# Patient Record
Sex: FEMALE | Race: AMERICAN INDIAN OR ALASKA NATIVE | Employment: UNEMPLOYED | URBAN - METROPOLITAN AREA
[De-identification: names, ages, dates, MRNs, and addresses within clinical notes are randomized per-mention and may not be internally consistent; named-entity substitution may affect disease eponyms.]

---

## 2024-01-01 ENCOUNTER — ANESTHESIA EVENT (OUTPATIENT)
Dept: SURGERY | Facility: MEDICAL CENTER | Age: 64
End: 2024-01-01
Payer: MEDICAID

## 2024-01-01 ENCOUNTER — ANESTHESIA (OUTPATIENT)
Dept: SURGERY | Facility: MEDICAL CENTER | Age: 64
End: 2024-01-01
Payer: MEDICAID

## 2024-01-01 ENCOUNTER — APPOINTMENT (OUTPATIENT)
Dept: RADIOLOGY | Facility: MEDICAL CENTER | Age: 64
End: 2024-01-01
Attending: INTERNAL MEDICINE
Payer: MEDICAID

## 2024-01-01 ENCOUNTER — APPOINTMENT (OUTPATIENT)
Dept: CARDIOLOGY | Facility: MEDICAL CENTER | Age: 64
End: 2024-01-01
Attending: INTERNAL MEDICINE
Payer: MEDICAID

## 2024-01-01 ENCOUNTER — HOSPITAL ENCOUNTER (INPATIENT)
Facility: MEDICAL CENTER | Age: 64
LOS: 1 days | End: 2024-03-29
Attending: INTERNAL MEDICINE | Admitting: INTERNAL MEDICINE
Payer: MEDICAID

## 2024-01-01 ENCOUNTER — HOSPITAL ENCOUNTER (OUTPATIENT)
Dept: RADIOLOGY | Facility: MEDICAL CENTER | Age: 64
End: 2024-03-28

## 2024-01-01 VITALS
HEART RATE: 54 BPM | WEIGHT: 276.68 LBS | DIASTOLIC BLOOD PRESSURE: 63 MMHG | HEIGHT: 64 IN | BODY MASS INDEX: 47.24 KG/M2 | OXYGEN SATURATION: 96 % | SYSTOLIC BLOOD PRESSURE: 89 MMHG | TEMPERATURE: 96.5 F | RESPIRATION RATE: 12 BRPM

## 2024-01-01 LAB
ABO + RH BLD: NORMAL
ABO GROUP BLD: NORMAL
ALBUMIN SERPL BCP-MCNC: 2.6 G/DL (ref 3.2–4.9)
ALBUMIN/GLOB SERPL: 0.8 G/DL
ALP SERPL-CCNC: 73 U/L (ref 30–99)
ALT SERPL-CCNC: 32 U/L (ref 2–50)
ANION GAP SERPL CALC-SCNC: 30 MMOL/L (ref 7–16)
ANISOCYTOSIS BLD QL SMEAR: ABNORMAL
APTT PPP: 52.6 SEC (ref 24.7–36)
AST SERPL-CCNC: 66 U/L (ref 12–45)
BASE EXCESS BLDA CALC-SCNC: -16 MMOL/L (ref -4–3)
BASOPHILS # BLD AUTO: 0 % (ref 0–1.8)
BASOPHILS # BLD: 0 K/UL (ref 0–0.12)
BILIRUB SERPL-MCNC: 0.6 MG/DL (ref 0.1–1.5)
BLD GP AB SCN SERPL QL: NORMAL
BODY TEMPERATURE: ABNORMAL DEGREES
BREATHS SETTING VENT: 22
BUN SERPL-MCNC: 32 MG/DL (ref 8–22)
BURR CELLS BLD QL SMEAR: NORMAL
CALCIUM ALBUM COR SERPL-MCNC: 9 MG/DL (ref 8.5–10.5)
CALCIUM SERPL-MCNC: 7.9 MG/DL (ref 8.5–10.5)
CFT BLD TEG: 7.5 MIN (ref 4.6–9.1)
CFT P HPASE BLD TEG: 7 MIN (ref 4.3–8.3)
CHLORIDE SERPL-SCNC: 103 MMOL/L (ref 96–112)
CLOT ANGLE BLD TEG: 72.7 DEGREES (ref 63–78)
CO2 BLDA-SCNC: 13 MMOL/L (ref 20–33)
CO2 SERPL-SCNC: 7 MMOL/L (ref 20–33)
CORTIS SERPL-MCNC: 43.5 UG/DL (ref 0–23)
CREAT SERPL-MCNC: 3.48 MG/DL (ref 0.5–1.4)
CT.EXTRINSIC BLD ROTEM: 1.3 MIN (ref 0.8–2.1)
DELSYS IDSYS: ABNORMAL
EOSINOPHIL # BLD AUTO: 0 K/UL (ref 0–0.51)
EOSINOPHIL NFR BLD: 0 % (ref 0–6.9)
ERYTHROCYTE [DISTWIDTH] IN BLOOD BY AUTOMATED COUNT: 59.1 FL (ref 35.9–50)
FIBRINOGEN PPP-MCNC: 346 MG/DL (ref 215–460)
GFR SERPLBLD CREATININE-BSD FMLA CKD-EPI: 14 ML/MIN/1.73 M 2
GLOBULIN SER CALC-MCNC: 3.2 G/DL (ref 1.9–3.5)
GLUCOSE BLD STRIP.AUTO-MCNC: 131 MG/DL (ref 65–99)
GLUCOSE BLD STRIP.AUTO-MCNC: 45 MG/DL (ref 65–99)
GLUCOSE SERPL-MCNC: 180 MG/DL (ref 65–99)
HCO3 BLDA-SCNC: 12.1 MMOL/L (ref 17–25)
HCT VFR BLD AUTO: 44 % (ref 37–47)
HGB BLD-MCNC: 13.6 G/DL (ref 12–16)
HOROWITZ INDEX BLDA+IHG-RTO: 226 MM[HG]
INR PPP: 1.56 (ref 0.87–1.13)
LACTATE BLD-SCNC: 12.2 MMOL/L (ref 0.5–2)
LACTATE SERPL-SCNC: 12.1 MMOL/L (ref 0.5–2)
LACTATE SERPL-SCNC: 14.3 MMOL/L (ref 0.5–2)
LYMPHOCYTES # BLD AUTO: 0.37 K/UL (ref 1–4.8)
LYMPHOCYTES NFR BLD: 0.9 % (ref 22–41)
MAGNESIUM SERPL-MCNC: 1.4 MG/DL (ref 1.5–2.5)
MANUAL DIFF BLD: ABNORMAL
MCF BLD TEG: 60.9 MM (ref 52–69)
MCF.PLATELET INHIB BLD ROTEM: 21.9 MM (ref 15–32)
MCH RBC QN AUTO: 32 PG (ref 27–33)
MCHC RBC AUTO-ENTMCNC: 30.9 G/DL (ref 32.2–35.5)
MCV RBC AUTO: 103.5 FL (ref 81.4–97.8)
METAMYELOCYTES NFR BLD MANUAL: 17 %
MICROCYTES BLD QL SMEAR: ABNORMAL
MODE IMODE: ABNORMAL
MONOCYTES # BLD AUTO: 0.33 K/UL (ref 0–0.85)
MONOCYTES NFR BLD AUTO: 0.8 % (ref 0–13.4)
MORPHOLOGY BLD-IMP: NORMAL
MYELOCYTES NFR BLD MANUAL: 0.8 %
NEUTROPHILS # BLD AUTO: 33.33 K/UL (ref 1.82–7.42)
NEUTROPHILS NFR BLD: 67.8 % (ref 44–72)
NEUTS BAND NFR BLD MANUAL: 12.7 % (ref 0–10)
NRBC # BLD AUTO: 0 K/UL
NRBC BLD-RTO: 0 /100 WBC (ref 0–0.2)
NT-PROBNP SERPL IA-MCNC: ABNORMAL PG/ML (ref 0–125)
O2/TOTAL GAS SETTING VFR VENT: 50 %
PA AA BLD-ACNC: 90.1 % (ref 0–11)
PA ADP BLD-ACNC: 97.4 % (ref 0–17)
PCO2 BLDA: 36.1 MMHG (ref 26–37)
PCO2 TEMP ADJ BLDA: 35.5 MMHG (ref 26–37)
PEEP END EXPIRATORY PRESSURE IPEEP: 8 CMH20
PH BLDA: 7.13 [PH] (ref 7.4–7.5)
PH TEMP ADJ BLDA: 7.14 [PH] (ref 7.4–7.5)
PHOSPHATE SERPL-MCNC: 6.4 MG/DL (ref 2.5–4.5)
PLATELET # BLD AUTO: 131 K/UL (ref 164–446)
PLATELET BLD QL SMEAR: NORMAL
PMV BLD AUTO: 11 FL (ref 9–12.9)
PO2 BLDA: 113 MMHG (ref 64–87)
PO2 TEMP ADJ BLDA: 111 MMHG (ref 64–87)
POIKILOCYTOSIS BLD QL SMEAR: NORMAL
POTASSIUM SERPL-SCNC: 4.8 MMOL/L (ref 3.6–5.5)
PROT SERPL-MCNC: 5.8 G/DL (ref 6–8.2)
PROTHROMBIN TIME: 18.9 SEC (ref 12–14.6)
RBC # BLD AUTO: 4.25 M/UL (ref 4.2–5.4)
RBC BLD AUTO: PRESENT
RH BLD: NORMAL
SAO2 % BLDA: 97 % (ref 93–99)
SODIUM SERPL-SCNC: 140 MMOL/L (ref 135–145)
SPECIMEN DRAWN FROM PATIENT: ABNORMAL
T4 FREE SERPL-MCNC: 0.99 NG/DL (ref 0.93–1.7)
TEG ALGORITHM TGALG: ABNORMAL
TIDAL VOLUME IVT: 340 ML
TROPONIN T SERPL-MCNC: 243 NG/L (ref 6–19)
TSH SERPL DL<=0.005 MIU/L-ACNC: 11.5 UIU/ML (ref 0.38–5.33)
WBC # BLD AUTO: 41.4 K/UL (ref 4.8–10.8)

## 2024-01-01 PROCEDURE — 160042 HCHG SURGERY MINUTES - EA ADDL 1 MIN LEVEL 5: Performed by: SURGERY

## 2024-01-01 PROCEDURE — 82533 TOTAL CORTISOL: CPT

## 2024-01-01 PROCEDURE — 700101 HCHG RX REV CODE 250: Performed by: NURSE PRACTITIONER

## 2024-01-01 PROCEDURE — 86850 RBC ANTIBODY SCREEN: CPT

## 2024-01-01 PROCEDURE — 71045 X-RAY EXAM CHEST 1 VIEW: CPT

## 2024-01-01 PROCEDURE — 94002 VENT MGMT INPAT INIT DAY: CPT

## 2024-01-01 PROCEDURE — 700101 HCHG RX REV CODE 250: Performed by: INTERNAL MEDICINE

## 2024-01-01 PROCEDURE — 85384 FIBRINOGEN ACTIVITY: CPT

## 2024-01-01 PROCEDURE — 49000 EXPLORATION OF ABDOMEN: CPT | Mod: 80 | Performed by: SURGERY

## 2024-01-01 PROCEDURE — 99292 CRITICAL CARE ADDL 30 MIN: CPT | Performed by: INTERNAL MEDICINE

## 2024-01-01 PROCEDURE — 99255 IP/OBS CONSLTJ NEW/EST HI 80: CPT | Mod: 57 | Performed by: SURGERY

## 2024-01-01 PROCEDURE — 700105 HCHG RX REV CODE 258: Performed by: INTERNAL MEDICINE

## 2024-01-01 PROCEDURE — 84484 ASSAY OF TROPONIN QUANT: CPT

## 2024-01-01 PROCEDURE — 86900 BLOOD TYPING SEROLOGIC ABO: CPT

## 2024-01-01 PROCEDURE — C1751 CATH, INF, PER/CENT/MIDLINE: HCPCS | Performed by: SURGERY

## 2024-01-01 PROCEDURE — 700111 HCHG RX REV CODE 636 W/ 250 OVERRIDE (IP): Performed by: ANESTHESIOLOGY

## 2024-01-01 PROCEDURE — 99291 CRITICAL CARE FIRST HOUR: CPT | Performed by: INTERNAL MEDICINE

## 2024-01-01 PROCEDURE — 97606 NEG PRS WND THER DME>50 SQCM: CPT | Performed by: SURGERY

## 2024-01-01 PROCEDURE — 80053 COMPREHEN METABOLIC PANEL: CPT

## 2024-01-01 PROCEDURE — 84443 ASSAY THYROID STIM HORMONE: CPT

## 2024-01-01 PROCEDURE — 85007 BL SMEAR W/DIFF WBC COUNT: CPT

## 2024-01-01 PROCEDURE — 700111 HCHG RX REV CODE 636 W/ 250 OVERRIDE (IP): Performed by: NURSE PRACTITIONER

## 2024-01-01 PROCEDURE — 160048 HCHG OR STATISTICAL LEVEL 1-5: Performed by: SURGERY

## 2024-01-01 PROCEDURE — 85027 COMPLETE CBC AUTOMATED: CPT

## 2024-01-01 PROCEDURE — 82962 GLUCOSE BLOOD TEST: CPT | Mod: 91

## 2024-01-01 PROCEDURE — 49000 EXPLORATION OF ABDOMEN: CPT | Performed by: SURGERY

## 2024-01-01 PROCEDURE — 82803 BLOOD GASES ANY COMBINATION: CPT

## 2024-01-01 PROCEDURE — 700111 HCHG RX REV CODE 636 W/ 250 OVERRIDE (IP): Performed by: INTERNAL MEDICINE

## 2024-01-01 PROCEDURE — 84100 ASSAY OF PHOSPHORUS: CPT

## 2024-01-01 PROCEDURE — 85610 PROTHROMBIN TIME: CPT

## 2024-01-01 PROCEDURE — 85384 FIBRINOGEN ACTIVITY: CPT | Mod: 91

## 2024-01-01 PROCEDURE — 83735 ASSAY OF MAGNESIUM: CPT

## 2024-01-01 PROCEDURE — 85730 THROMBOPLASTIN TIME PARTIAL: CPT

## 2024-01-01 PROCEDURE — 97606 NEG PRS WND THER DME>50 SQCM: CPT | Mod: 80 | Performed by: SURGERY

## 2024-01-01 PROCEDURE — 83605 ASSAY OF LACTIC ACID: CPT

## 2024-01-01 PROCEDURE — 85347 COAGULATION TIME ACTIVATED: CPT

## 2024-01-01 PROCEDURE — 700105 HCHG RX REV CODE 258: Performed by: ANESTHESIOLOGY

## 2024-01-01 PROCEDURE — 700101 HCHG RX REV CODE 250: Performed by: ANESTHESIOLOGY

## 2024-01-01 PROCEDURE — 83880 ASSAY OF NATRIURETIC PEPTIDE: CPT

## 2024-01-01 PROCEDURE — 0DJD0ZZ INSPECTION OF LOWER INTESTINAL TRACT, OPEN APPROACH: ICD-10-PCS | Performed by: SURGERY

## 2024-01-01 PROCEDURE — 5A1935Z RESPIRATORY VENTILATION, LESS THAN 24 CONSECUTIVE HOURS: ICD-10-PCS | Performed by: SURGERY

## 2024-01-01 PROCEDURE — 94003 VENT MGMT INPAT SUBQ DAY: CPT

## 2024-01-01 PROCEDURE — 770022 HCHG ROOM/CARE - ICU (200)

## 2024-01-01 PROCEDURE — 37799 UNLISTED PX VASCULAR SURGERY: CPT

## 2024-01-01 PROCEDURE — 700105 HCHG RX REV CODE 258: Performed by: NURSE PRACTITIONER

## 2024-01-01 PROCEDURE — 85576 BLOOD PLATELET AGGREGATION: CPT | Mod: 91

## 2024-01-01 PROCEDURE — 700111 HCHG RX REV CODE 636 W/ 250 OVERRIDE (IP): Mod: JZ | Performed by: INTERNAL MEDICINE

## 2024-01-01 PROCEDURE — 160009 HCHG ANES TIME/MIN: Performed by: SURGERY

## 2024-01-01 PROCEDURE — 84439 ASSAY OF FREE THYROXINE: CPT

## 2024-01-01 PROCEDURE — 86901 BLOOD TYPING SEROLOGIC RH(D): CPT

## 2024-01-01 PROCEDURE — 94799 UNLISTED PULMONARY SVC/PX: CPT

## 2024-01-01 PROCEDURE — 160031 HCHG SURGERY MINUTES - 1ST 30 MINS LEVEL 5: Performed by: SURGERY

## 2024-01-01 RX ORDER — AMOXICILLIN 250 MG
2 CAPSULE ORAL 2 TIMES DAILY
Status: DISCONTINUED | OUTPATIENT
Start: 2024-01-01 | End: 2024-01-01

## 2024-01-01 RX ORDER — ONDANSETRON 2 MG/ML
4 INJECTION INTRAMUSCULAR; INTRAVENOUS EVERY 4 HOURS PRN
Status: DISCONTINUED | OUTPATIENT
Start: 2024-01-01 | End: 2024-01-01

## 2024-01-01 RX ORDER — PROMETHAZINE HYDROCHLORIDE 25 MG/1
12.5-25 TABLET ORAL EVERY 4 HOURS PRN
Status: DISCONTINUED | OUTPATIENT
Start: 2024-01-01 | End: 2024-01-01

## 2024-01-01 RX ORDER — FAMOTIDINE 20 MG/1
20 TABLET, FILM COATED ORAL DAILY
Status: DISCONTINUED | OUTPATIENT
Start: 2024-01-01 | End: 2024-01-01

## 2024-01-01 RX ORDER — HYDROMORPHONE HYDROCHLORIDE 2 MG/ML
2 INJECTION, SOLUTION INTRAMUSCULAR; INTRAVENOUS; SUBCUTANEOUS
Status: DISCONTINUED | OUTPATIENT
Start: 2024-01-01 | End: 2024-01-01 | Stop reason: HOSPADM

## 2024-01-01 RX ORDER — LORAZEPAM 2 MG/ML
1 CONCENTRATE ORAL
Status: DISCONTINUED | OUTPATIENT
Start: 2024-01-01 | End: 2024-01-01 | Stop reason: HOSPADM

## 2024-01-01 RX ORDER — CEFOTETAN DISODIUM 2 G/20ML
INJECTION, POWDER, FOR SOLUTION INTRAMUSCULAR; INTRAVENOUS PRN
Status: DISCONTINUED | OUTPATIENT
Start: 2024-01-01 | End: 2024-01-01 | Stop reason: SURG

## 2024-01-01 RX ORDER — DEXMEDETOMIDINE HYDROCHLORIDE 4 UG/ML
.1-1.5 INJECTION, SOLUTION INTRAVENOUS CONTINUOUS
Status: DISCONTINUED | OUTPATIENT
Start: 2024-01-01 | End: 2024-01-01

## 2024-01-01 RX ORDER — FUROSEMIDE 40 MG/1
40 TABLET ORAL
COMMUNITY

## 2024-01-01 RX ORDER — CALCIUM GLUCONATE 20 MG/ML
1 INJECTION, SOLUTION INTRAVENOUS ONCE
Status: COMPLETED | OUTPATIENT
Start: 2024-01-01 | End: 2024-01-01

## 2024-01-01 RX ORDER — ETOMIDATE 2 MG/ML
INJECTION INTRAVENOUS PRN
Status: DISCONTINUED | OUTPATIENT
Start: 2024-01-01 | End: 2024-01-01 | Stop reason: SURG

## 2024-01-01 RX ORDER — SODIUM CHLORIDE, SODIUM LACTATE, POTASSIUM CHLORIDE, CALCIUM CHLORIDE 600; 310; 30; 20 MG/100ML; MG/100ML; MG/100ML; MG/100ML
INJECTION, SOLUTION INTRAVENOUS
Status: DISCONTINUED | OUTPATIENT
Start: 2024-01-01 | End: 2024-01-01 | Stop reason: SURG

## 2024-01-01 RX ORDER — HYDROMORPHONE HYDROCHLORIDE 2 MG/ML
4 INJECTION, SOLUTION INTRAMUSCULAR; INTRAVENOUS; SUBCUTANEOUS
Status: DISCONTINUED | OUTPATIENT
Start: 2024-01-01 | End: 2024-01-01 | Stop reason: HOSPADM

## 2024-01-01 RX ORDER — ROCURONIUM BROMIDE 10 MG/ML
INJECTION, SOLUTION INTRAVENOUS PRN
Status: DISCONTINUED | OUTPATIENT
Start: 2024-01-01 | End: 2024-01-01 | Stop reason: SURG

## 2024-01-01 RX ORDER — SODIUM CHLORIDE, SODIUM LACTATE, POTASSIUM CHLORIDE, AND CALCIUM CHLORIDE .6; .31; .03; .02 G/100ML; G/100ML; G/100ML; G/100ML
1000 INJECTION, SOLUTION INTRAVENOUS ONCE
Status: COMPLETED | OUTPATIENT
Start: 2024-01-01 | End: 2024-01-01

## 2024-01-01 RX ORDER — MAGNESIUM SULFATE HEPTAHYDRATE 40 MG/ML
4 INJECTION, SOLUTION INTRAVENOUS ONCE
Status: COMPLETED | OUTPATIENT
Start: 2024-01-01 | End: 2024-01-01

## 2024-01-01 RX ORDER — LORAZEPAM 2 MG/ML
2 INJECTION INTRAMUSCULAR
Status: DISCONTINUED | OUTPATIENT
Start: 2024-01-01 | End: 2024-01-01 | Stop reason: HOSPADM

## 2024-01-01 RX ORDER — ATROPINE SULFATE 10 MG/ML
2 SOLUTION/ DROPS OPHTHALMIC EVERY 4 HOURS PRN
Status: DISCONTINUED | OUTPATIENT
Start: 2024-01-01 | End: 2024-01-01 | Stop reason: HOSPADM

## 2024-01-01 RX ORDER — HYDROMORPHONE HYDROCHLORIDE 1 MG/ML
1.5 INJECTION, SOLUTION INTRAMUSCULAR; INTRAVENOUS; SUBCUTANEOUS
Status: DISCONTINUED | OUTPATIENT
Start: 2024-01-01 | End: 2024-01-01

## 2024-01-01 RX ORDER — PROCHLORPERAZINE EDISYLATE 5 MG/ML
5-10 INJECTION INTRAMUSCULAR; INTRAVENOUS EVERY 4 HOURS PRN
Status: DISCONTINUED | OUTPATIENT
Start: 2024-01-01 | End: 2024-01-01

## 2024-01-01 RX ORDER — HYDROMORPHONE HYDROCHLORIDE 1 MG/ML
0.75 INJECTION, SOLUTION INTRAMUSCULAR; INTRAVENOUS; SUBCUTANEOUS
Status: DISCONTINUED | OUTPATIENT
Start: 2024-01-01 | End: 2024-01-01

## 2024-01-01 RX ORDER — SUCCINYLCHOLINE CHLORIDE 20 MG/ML
INJECTION INTRAMUSCULAR; INTRAVENOUS PRN
Status: DISCONTINUED | OUTPATIENT
Start: 2024-01-01 | End: 2024-01-01 | Stop reason: SURG

## 2024-01-01 RX ORDER — SODIUM BICARBONATE IN D5W 150/1000ML
PLASTIC BAG, INJECTION (ML) INTRAVENOUS CONTINUOUS
Status: DISCONTINUED | OUTPATIENT
Start: 2024-01-01 | End: 2024-01-01

## 2024-01-01 RX ORDER — ONDANSETRON 4 MG/1
4 TABLET, ORALLY DISINTEGRATING ORAL EVERY 4 HOURS PRN
Status: DISCONTINUED | OUTPATIENT
Start: 2024-01-01 | End: 2024-01-01

## 2024-01-01 RX ORDER — BISACODYL 10 MG
10 SUPPOSITORY, RECTAL RECTAL
Status: DISCONTINUED | OUTPATIENT
Start: 2024-01-01 | End: 2024-01-01

## 2024-01-01 RX ORDER — APIXABAN 5 MG/1
5 TABLET, FILM COATED ORAL 2 TIMES DAILY
COMMUNITY
Start: 2024-01-01

## 2024-01-01 RX ORDER — SACUBITRIL AND VALSARTAN 24; 26 MG/1; MG/1
1 TABLET, FILM COATED ORAL 2 TIMES DAILY
Status: ON HOLD | COMMUNITY
Start: 2024-01-01 | End: 2024-01-01

## 2024-01-01 RX ORDER — NOREPINEPHRINE BITARTRATE 0.03 MG/ML
0-1 INJECTION, SOLUTION INTRAVENOUS CONTINUOUS
Status: DISCONTINUED | OUTPATIENT
Start: 2024-01-01 | End: 2024-01-01

## 2024-01-01 RX ORDER — POLYETHYLENE GLYCOL 3350 17 G/17G
1 POWDER, FOR SOLUTION ORAL
Status: DISCONTINUED | OUTPATIENT
Start: 2024-01-01 | End: 2024-01-01

## 2024-01-01 RX ORDER — SODIUM CHLORIDE, SODIUM LACTATE, POTASSIUM CHLORIDE, CALCIUM CHLORIDE 600; 310; 30; 20 MG/100ML; MG/100ML; MG/100ML; MG/100ML
INJECTION, SOLUTION INTRAVENOUS CONTINUOUS
Status: DISCONTINUED | OUTPATIENT
Start: 2024-01-01 | End: 2024-01-01

## 2024-01-01 RX ORDER — NOREPINEPHRINE BITARTRATE 0.03 MG/ML
INJECTION, SOLUTION INTRAVENOUS
Status: DISCONTINUED
Start: 2024-01-01 | End: 2024-01-01

## 2024-01-01 RX ORDER — PROMETHAZINE HYDROCHLORIDE 25 MG/1
12.5-25 SUPPOSITORY RECTAL EVERY 4 HOURS PRN
Status: DISCONTINUED | OUTPATIENT
Start: 2024-01-01 | End: 2024-01-01

## 2024-01-01 RX ADMIN — SODIUM BICARBONATE 100 MEQ: 84 INJECTION INTRAVENOUS at 16:47

## 2024-01-01 RX ADMIN — HYDROMORPHONE HYDROCHLORIDE 2 MG: 2 INJECTION, SOLUTION INTRAMUSCULAR; INTRAVENOUS; SUBCUTANEOUS at 01:37

## 2024-01-01 RX ADMIN — PHENYLEPHRINE HYDROCHLORIDE 0.25 MCG/KG/MIN: 100 INJECTION INTRAVENOUS at 23:06

## 2024-01-01 RX ADMIN — DEXMEDETOMIDINE HYDROCHLORIDE 0.2 MCG/KG/HR: 100 INJECTION, SOLUTION INTRAVENOUS at 22:28

## 2024-01-01 RX ADMIN — HYDROMORPHONE HYDROCHLORIDE 2 MG: 2 INJECTION, SOLUTION INTRAMUSCULAR; INTRAVENOUS; SUBCUTANEOUS at 01:12

## 2024-01-01 RX ADMIN — PIPERACILLIN AND TAZOBACTAM 4.5 G: 4; .5 INJECTION, POWDER, FOR SOLUTION INTRAVENOUS at 16:26

## 2024-01-01 RX ADMIN — HYDROMORPHONE HYDROCHLORIDE 1.5 MG: 1 INJECTION, SOLUTION INTRAMUSCULAR; INTRAVENOUS; SUBCUTANEOUS at 19:45

## 2024-01-01 RX ADMIN — MAGNESIUM SULFATE HEPTAHYDRATE 4 G: 4 INJECTION, SOLUTION INTRAVENOUS at 21:49

## 2024-01-01 RX ADMIN — SODIUM CHLORIDE, POTASSIUM CHLORIDE, SODIUM LACTATE AND CALCIUM CHLORIDE: 600; 310; 30; 20 INJECTION, SOLUTION INTRAVENOUS at 18:15

## 2024-01-01 RX ADMIN — ONDANSETRON 4 MG: 2 INJECTION INTRAMUSCULAR; INTRAVENOUS at 17:09

## 2024-01-01 RX ADMIN — CALCIUM GLUCONATE 1 G: 20 INJECTION, SOLUTION INTRAVENOUS at 23:24

## 2024-01-01 RX ADMIN — DILTIAZEM HYDROCHLORIDE 5 MG/HR: 5 INJECTION INTRAVENOUS at 20:53

## 2024-01-01 RX ADMIN — NOREPINEPHRINE BITARTRATE 0.1 MCG/KG/MIN: 1 INJECTION, SOLUTION, CONCENTRATE INTRAVENOUS at 18:32

## 2024-01-01 RX ADMIN — NOREPINEPHRINE BITARTRATE 1 MCG/KG/MIN: 1 INJECTION, SOLUTION, CONCENTRATE INTRAVENOUS at 23:47

## 2024-01-01 RX ADMIN — VASOPRESSIN 0.03 UNITS/MIN: 20 INJECTION INTRAVENOUS at 22:15

## 2024-01-01 RX ADMIN — PIPERACILLIN AND TAZOBACTAM 4.5 G: 4; .5 INJECTION, POWDER, FOR SOLUTION INTRAVENOUS at 19:48

## 2024-01-01 RX ADMIN — Medication 1.5 MG/HR: at 20:58

## 2024-01-01 RX ADMIN — SUCCINYLCHOLINE CHLORIDE 200 MG: 20 INJECTION, SOLUTION INTRAMUSCULAR; INTRAVENOUS at 18:29

## 2024-01-01 RX ADMIN — ROCURONIUM BROMIDE 30 MG: 50 INJECTION, SOLUTION INTRAVENOUS at 18:59

## 2024-01-01 RX ADMIN — LORAZEPAM 2 MG: 2 INJECTION INTRAMUSCULAR; INTRAVENOUS at 02:08

## 2024-01-01 RX ADMIN — HYDROMORPHONE HYDROCHLORIDE 0.75 MG: 1 INJECTION, SOLUTION INTRAMUSCULAR; INTRAVENOUS; SUBCUTANEOUS at 19:32

## 2024-01-01 RX ADMIN — CEFOTETAN DISODIUM 2 G: 2 INJECTION, POWDER, FOR SOLUTION INTRAMUSCULAR; INTRAVENOUS at 18:29

## 2024-01-01 RX ADMIN — DEXTROSE MONOHYDRATE 25 G: 100 INJECTION, SOLUTION INTRAVENOUS at 16:24

## 2024-01-01 RX ADMIN — ETOMIDATE 16 MG: 2 INJECTION, SOLUTION INTRAVENOUS at 18:29

## 2024-01-01 RX ADMIN — SODIUM CHLORIDE, POTASSIUM CHLORIDE, SODIUM LACTATE AND CALCIUM CHLORIDE 1000 ML: 600; 310; 30; 20 INJECTION, SOLUTION INTRAVENOUS at 21:23

## 2024-01-01 RX ADMIN — SODIUM BICARBONATE: 84 INJECTION, SOLUTION INTRAVENOUS at 16:58

## 2024-01-01 RX ADMIN — SUGAMMADEX 200 MG: 100 INJECTION, SOLUTION INTRAVENOUS at 19:25

## 2024-01-01 RX ADMIN — NOREPINEPHRINE BITARTRATE 0.85 MCG/KG/MIN: 1 INJECTION INTRAVENOUS at 21:30

## 2024-01-01 RX ADMIN — NOREPINEPHRINE BITARTRATE 0.85 MCG/KG/MIN: 1 INJECTION INTRAVENOUS at 21:56

## 2024-01-01 ASSESSMENT — PAIN DESCRIPTION - PAIN TYPE
TYPE: ACUTE PAIN

## 2024-01-01 ASSESSMENT — ENCOUNTER SYMPTOMS
COUGH: 0
FEVER: 0
ABDOMINAL PAIN: 1
BRUISES/BLEEDS EASILY: 0
SORE THROAT: 0
BACK PAIN: 0
WEAKNESS: 0
DOUBLE VISION: 0
SHORTNESS OF BREATH: 1
FOCAL WEAKNESS: 0
VOMITING: 0
SENSORY CHANGE: 0
BLURRED VISION: 0
CHILLS: 0
NECK PAIN: 0
DIZZINESS: 0
NERVOUS/ANXIOUS: 0
DEPRESSION: 0
DIARRHEA: 1
NAUSEA: 0
FLANK PAIN: 0

## 2024-01-01 ASSESSMENT — FIBROSIS 4 INDEX: FIB4 SCORE: 4.33

## 2024-01-01 ASSESSMENT — PAIN SCALES - GENERAL: PAIN_LEVEL: 5

## 2024-03-28 PROBLEM — G93.40 ACUTE ENCEPHALOPATHY: Status: ACTIVE | Noted: 2024-01-01

## 2024-03-28 PROBLEM — K55.9 MESENTERIC ISCHEMIA (HCC): Status: ACTIVE | Noted: 2024-01-01

## 2024-03-28 PROBLEM — E87.6 HYPOKALEMIA: Status: ACTIVE | Noted: 2024-01-01

## 2024-03-28 PROBLEM — E87.20 LACTIC ACIDOSIS: Status: ACTIVE | Noted: 2024-01-01

## 2024-03-28 PROBLEM — G47.33 OSA (OBSTRUCTIVE SLEEP APNEA): Status: ACTIVE | Noted: 2024-01-01

## 2024-03-28 PROBLEM — I48.91 ATRIAL FIBRILLATION WITH RVR (HCC): Status: ACTIVE | Noted: 2024-01-01

## 2024-03-28 PROBLEM — R65.20 SEVERE SEPSIS (HCC): Status: ACTIVE | Noted: 2024-01-01

## 2024-03-28 PROBLEM — E66.01 MORBID OBESITY (HCC): Status: ACTIVE | Noted: 2024-01-01

## 2024-03-28 PROBLEM — N17.9 AKI (ACUTE KIDNEY INJURY) (HCC): Status: ACTIVE | Noted: 2024-01-01

## 2024-03-28 PROBLEM — A41.9 SEVERE SEPSIS (HCC): Status: ACTIVE | Noted: 2024-01-01

## 2024-03-28 PROBLEM — R79.89 ELEVATED TROPONIN: Status: ACTIVE | Noted: 2024-01-01

## 2024-03-28 PROBLEM — E16.2 HYPOGLYCEMIA: Status: ACTIVE | Noted: 2024-01-01

## 2024-03-28 PROBLEM — I50.22 CHRONIC SYSTOLIC HEART FAILURE (HCC): Status: ACTIVE | Noted: 2024-01-01

## 2024-03-28 PROBLEM — Z79.02 ANTIPLATELET OR ANTITHROMBOTIC LONG-TERM USE: Status: ACTIVE | Noted: 2024-01-01

## 2024-03-28 NOTE — PROGRESS NOTES
4 Eyes Skin Assessment Completed by MERVIN Davis and MERVIN Reed.    Head WDL  Ears WDL  Nose WDL  Mouth Bleeding  Tongue bleeding/dry/cracked   Neck WDL  Breast/Chest WDL  Shoulder Blades WDL  Spine WDL  (R) Arm/Elbow/Hand Redness and Non-Blanching mottled  (L) Arm/Elbow/Hand Redness and Non-Blanching mottled  Abdomen WDL  Groin Redness, mottled, moist  Scrotum/Coccyx/Buttocks Redness mottled  (R) Leg Redness and Non-Blanching ulcers  (L) Leg Redness and Non-Blanching ulcers  (R) Heel/Foot/Toe Redness and Non-Blanching, ulcers  (L) Heel/Foot/Toe Redness and Non-Blanching, ulcers          Devices In Places ECG, Blood Pressure Cuff, Pulse Ox, and SCD's      Interventions In Place Gray Ear Foams, Sacral Mepilex, TAP System, Pillows, Q2 Turns, Low Air Loss Mattress, Barrier Cream, and Heels Loaded W/Pillows    Possible Skin Injury No    Pictures Uploaded Into Epic Yes  Wound Consult Placed N/A  RN Wound Prevention Protocol Ordered No

## 2024-03-28 NOTE — CONSULTS
DATE OF CONSULTATION:  3/28/2024     REFERRING PHYSICIAN:   Raulito Menjivar M.D.     CONSULTING PHYSICIAN:  Melissa Ratliff M.D.     REASON FOR CONSULTATION:  I have been asked by Dr. Menjivar to see the patient in surgical consultation for evaluation of portal venous gas.    HISTORY OF PRESENT ILLNESS: The patient is a 63 year-old  morbidly obese woman who is very ill and mottled appearing and states she has been incredibly ill for three days.  She went to an outside hospital today and has severe abdominal pain.  No distention.  Her pain is out of proportion to exam.  She denies nausea or vomiting.  She also denies fever. She was initially quite hypotensive at the outside hospital, but improved with fluid resuscitation.  She has a history of Atrial fibrillation with RVR as well as severe heart failure, but is non compliant with her medications.  She states she has not been taking the eliquis she is supposed to be on.  She has a severe leukocytosis and portal venous gas on CT as well as pain out of proportion to exam.  Also, concerning, she has a very elevated troponin from the outside hospital.     PAST MEDICAL HISTORY:  Atrial fibrillation, Heart Failure (30% LVEF in 2022 with severe right sided dilation and hypokinesis), morbid obesity, Sleep apnea    PAST SURGICAL HISTORY:  has no past surgical history on file.    ALLERGIES: No Known Allergies    CURRENT MEDICATIONS:    Home Medications       Reviewed by Larry Howe (Pharmacy Tech) on 03/28/24 at 1622  Med List Status: Unable to Obtain     Medication Last Dose Status        Patient Denies Taking any Medications                           FAMILY HISTORY: family history is not on file.    SOCIAL HISTORY:  Sister is her decision maker    REVIEW OF SYSTEMS: Comprehensive review of systems is negative with the exception of the aforementioned HPI, PMH, and PSH bullets in accordance with CMS guidelines.    PHYSICAL EXAMINATION:    Physical Exam  Vitals  and nursing note reviewed. Exam conducted with a chaperone present.   Constitutional:       General: She is in acute distress.      Appearance: She is obese. She is ill-appearing and toxic-appearing.   HENT:      Head: Normocephalic.      Right Ear: External ear normal.      Left Ear: External ear normal.      Nose: Nose normal.      Mouth/Throat:      Mouth: Mucous membranes are dry.      Pharynx: Oropharynx is clear.   Eyes:      Extraocular Movements: Extraocular movements intact.      Pupils: Pupils are equal, round, and reactive to light.   Cardiovascular:      Rate and Rhythm: Tachycardia present. Rhythm irregular.      Pulses: Normal pulses.   Pulmonary:      Breath sounds: Wheezing present.      Comments: tachypnea  Abdominal:      Palpations: Abdomen is soft.      Tenderness: There is abdominal tenderness. There is guarding and rebound.   Musculoskeletal:         General: Swelling present. No deformity.      Cervical back: Neck supple.      Right lower leg: Edema present.      Left lower leg: Edema present.   Skin:     Comments: Severe mottling throughout.   Neurological:      Mental Status: She is oriented to person, place, and time.   Psychiatric:      Comments: anxious         LABORATORY VALUES:     WBC 35.8    Recent Labs     03/28/24  1049   RBC 4.42   HEMOGLOBIN 14.0   HEMATOCRIT 43.1   MCV 97.5   MCH 31.7   MCHC 32.5*   RDW 16.2*   PLATELETCT 123*   MPV 9.2                    IMAGING:   CT-CTA ABDOMEN WITH & W/O-POST PROCESS    (Results Pending)   EC-ECHOCARDIOGRAM COMPLETE W/O CONT    (Results Pending)       ASSESSMENT AND PLAN:     Pain out of proportion to exam with lactic acidosis and mottling- concern for mesenteric ischemia, particularly with severe portal venous gas. Will take to the OR for exploration.  Without surgery she will likely die of her disease, however, even with surgery it is currently unknown if we can save her life. Type and cross sent.  Severe high risk for anesthetic.   Discussed with medical intensivist and patient.    New labs sent.   No new Assessment & Plan notes have been filed under this hospital service since the last note was generated.  Service: Surgery General      DISPOSITION: Medical evaluation and admission. The patient was admitted to the Medical Service prior to surgical consultation. [facility] will follow.     ____________________________________     Melissa Ratliff M.D.    DD: 3/28/2024  4:40 PM

## 2024-03-28 NOTE — CONSULTS
[facility] INTENSIVIST DIRECT ADMISSION REPORT    Transferring facility: [facility]  Transferring physician: CROW Lincoln  Chief complaint: Abdominal pain  Pertinent history & patient course: 63-year-old woman with a history of noncompliance with medications, atrial fibrillation on Eliquis and obesity who presents with A-fib RVR and hypotension with BP in the 80s.  Patient received 30 cc/kg fluid bolus and additional fluids after that with improvement in blood pressure.  She was pancultured and started initially on ceftriaxone and then switched to Zosyn.  IV diltiazem has been titrated to control heart rate which is down from 150 into the 90s.  Blood pressure most recently 119/60 with the patient mentating and not having any amatory issues.  WBC was 35.8, platelet count 123, INR 1.2, she has early JOCELYNN with mildly reduced calcium.  Lactic acid is quite elevated at 11.7 and came down to 11.2 with fluids on repeat.  Troponin is elevated as well and almost 2000.  CTA chest did not reveal pulmonary embolus, some bibasilar scarring and some food like material in the esophagus.  Pertinent imaging & lab results: See above  Code Status: Full per transferring provider, I personally verified with the transferring provider patient's code status and the transferring provider has confirmed this with the patient.  Further work up or recommendations prior to transfer:   Consultants called prior to transfer and pertinent input from consultants: Requested our talk to reach out to surgery, I spoke with Dr. Ratliff (who called me) who is scrubbing into a case and she agreed this may be a surgical emergency.  We may need vascular surgery but ideally have a CTA abdomen first on arrival to to confirm embolic event and thrombus.  Patient accepted for transfer: Yes  Consultants to be called upon arrival: [facility] and [facility] surgery  Floor requested: TICU  ADT order placed for accepted patient: Yes    Please inform the  Intensivist upon assignment of an ICU bed and then again on arrival of the patient.

## 2024-03-28 NOTE — PROGRESS NOTES
Med rec complete per Pt and [facility]. Per pt she picked up medications and probably took them a month ago, but hasn't since. Per [facility], pt has not picked up Entresto since September 10th, 2023, but had a new RX for Entresto returned to stock 2/9/24.  Allergies reviewed

## 2024-03-29 NOTE — ANESTHESIA POSTPROCEDURE EVALUATION
Patient: Keyla Mancini    Procedure Summary       Date: 03/28/24 Room / Location: [place] [address] / SURGERY [facility]    Anesthesia Start: 1815 Anesthesia Stop: 1928    Procedure: LAPAROTOMY, EXPLORATORY (Abdomen) Diagnosis: (ISCHEMIC BOWEL)    Surgeons: Melissa Ratliff M.D. Responsible Provider: Stanley Davis M.D.    Anesthesia Type: general ASA Status: 5 - Emergent            Final Anesthesia Type: general  Last vitals  BP   Blood Pressure: 96/54    Temp   35.8 °C (96.5 °F)    Pulse   (!) 125   Resp   (!) 25    SpO2   99 %      Anesthesia Post Evaluation    Patient location during evaluation: ICU  Patient participation: complete - patient cannot participate  Post-procedure mental status: sedated.  Pain score: 5    Airway patency: patent  Anesthetic complications: no  Cardiovascular status: hemodynamically stable  Respiratory status: ETT, intubated and ventilator  Hydration status: euvolemic    PONV: none  patient was unable to participate        No notable events documented.

## 2024-03-29 NOTE — CARE PLAN
The patient is Unstable - High likelihood or risk of patient condition declining or worsening    Shift Goals  Clinical Goals: emergency surgery  Patient Goals: Pain relief  Family Goals: Updates    Problem: Pain - Standard  Goal: Alleviation of pain or a reduction in pain to the patient’s comfort goal  Outcome: Progressing     Problem: Safety - Medical Restraint  Goal: Remains free of injury from restraints (Restraint for Interference with Medical Device)  Outcome: Progressing

## 2024-03-29 NOTE — OP REPORT
DATE OF OPERATION:  3/28/2024    PREOPERATIVE DIAGNOSIS:  Ischemic bowel, Portal Venous gas    POSTOPERATIVE DIAGNOSIS: same    PROCEDURE PERFORMED: Exploratory Laparotomy and placement of wound vac > 50 sq cm    SURGEON:    Melissa Ratliff M.D.    ASSISTANT:    Colt Allred M.D.    ANESTHESIOLOGIST:  Stanley Davis M.D.    ANESTHESIA:   General endotracheal anesthesia.    ASA CLASSIFICATION:  IV. Emergent.    INDICATIONS: The patient is a 63 year-old woman with concern for ischemic bowel both on imaging and physical exam.  She was transferred to [facility] very septic.  A very ria discussion regarding her care was had with her family and they wanted us to try everything for her. She is taken to the operating room for exploratory laparotomy.    The complexity of the surgical procedure necessitated additional skilled operative assistance from another surgeon. The assistant was present during the entire operation. The surgical assistant performed the following: provided assistance with optimal surgical exposure of the operative field and provided high complexity, subspecialty decision making input.    FINDINGS: The entirety of the small bowel was ischemic and nearly necrotic.  Only approximately 30 cm would have been salvageable which would not be enough for her to survive on.     WOUND CLASSIFICATION:  Class IV, Dirty or Infected. Infection present at the time of surgery (PATOS).    SPECIMEN:     None.    ESTIMATED BLOOD LOSS:  150 mL.    PROCEDURE: Following informed consent consent, the patient was properly identified, taken to the operating room and placed in supine position where a general endotracheal anesthesia was administered. Intravenous antibiotics were administered by the anesthesiologist in correct time interval. The patient arrived with a previously placed Albarran catheter. Sequential compression devices were employed. A safety retension strap was secured. Active patient warming devices  were utilized. The operative site was widely prepped and draped into a sterile field.  A timeout was conducted with the full attention and participation of all operating room personnel.      A 10 blade scalpel was used to make a midline laparotomy incision.  Electrocautery was used to dissect down through the fascia.  The peritoneum was entered bluntly.  The abdomen was opened widely.  Upon entering the abdomen the omentum appeared necrotic and dusky.  We could then begin to see small bowel that was also necrotic and dusky appearing.  The entirety of the small bowel from the ligament of Treitz down to the distal jejunum was dusky and ischemic.  There was approximately 20 to 30 cm of potentially viable small bowel followed by ischemic bowel all throughout the ileum down to the terminal ileum.  The transverse colon also appeared ischemic.  With close inspection of the bowel small air bubbles could be seen throughout the mesentery.  With thorough evaluation and after further discussion this bowel is to ischemic and would not recover with restoration of the vasculature.  We determined that unfortunately the ischemia was too far advanced and nothing could be done for the patient.  An ABThera wound VAC was placed.    The patient tolerated the procedure, although she was very critical and there were no apparent complications. All sponge, needle, and instrument counts were correct on 2 separate occasions. The patient was was mechanically ventilated and transferred to to the surgical intensive care unit (SICU) in grave condition. I spoke with Keyla's daughter Miranda to update her on her mother's condition.        ____________________________________     Melissa Ratliff M.D.    DD: 3/28/2024  7:13 PM

## 2024-03-29 NOTE — ANESTHESIA PROCEDURE NOTES
Arterial Line    Performed by: Stanley Davis M.D.  Authorized by: Stanley Davis M.D.    Start Time:  3/28/2024 6:24 PM  End Time:  3/28/2024 6:25 PM  Localization: ultrasound guidance and surface landmarks    Patient Location:  OR  Indication: continuous blood pressure monitoring        Catheter Size:  20 G  Seldinger Technique?: Yes    Laterality:  Right  Site:  Radial artery  Line Secured:  Antimicrobial disc, tape and transparent dressing  Events: patient tolerated procedure well with no complications

## 2024-03-29 NOTE — H&P
Critical Care History & Physical Note    Date of Service  3/28/2024    Primary Care Physician  No primary care provider on file.    Consultants  critical care and general surgery    Specialist Names: Dr. Milena Ratliff    Code Status  Full Code    Chief Complaint  Abdominal pain    History of Presenting Illness  Keyla Mancini is a 63 y.o. female with the past medical history of NESHA, chronic systolic CHF with EF of 30-35%, RV dilation with dysfunction, morbid obesity, atrial fibrillation on Eliquis, and COPD who presented to [facility] on 3/28/2024 with abdominal pain with diarrhea for several days.  She was found to be in atrial fibrillation with RVR as well as hypotensive with SBPs in the 80s.  The patient was given a 30 cc/kg fluid bolus with additional IVF with improvement to SBPs.  She was given Zosyn.  She was given IV diltiazem for her atrial fibrillation.  CTA chest did not show a pulmonary embolus, but bilateral lower lobe atelectasis.  CT abdomen/pelvis revealed extensive portal venous gas and the surgery team at [facility] was uncomfortable with operating on the patient and sent the patient to [facility] for high level of care.    I discussed the plan of care with patient, family, bedside RN, charge RN, pharmacy, and general surgery.    Review of Systems  Review of Systems   Constitutional:  Positive for malaise/fatigue. Negative for chills and fever.   HENT:  Negative for congestion and sore throat.    Eyes:  Negative for blurred vision and double vision.   Respiratory:  Positive for shortness of breath. Negative for cough.    Cardiovascular:  Negative for chest pain and leg swelling.   Gastrointestinal:  Positive for abdominal pain and diarrhea. Negative for nausea and vomiting.   Genitourinary:  Negative for flank pain and hematuria.   Musculoskeletal:  Negative for back pain and neck pain.   Skin:  Negative for rash.   Neurological:  Negative for dizziness, sensory change, focal weakness and  weakness.   Endo/Heme/Allergies:  Does not bruise/bleed easily.   Psychiatric/Behavioral:  Negative for depression. The patient is not nervous/anxious.        Past Medical History  NESHA, atrial fibrillation on eliquis, HFrEF with EF of 30-35%, COPD, obesity    Surgical History  Unable to obtain due to patient's acuity of illness    Family History  Unable to obtain due to patient's acuity of illness.     Social History   Tobacco use but unknown how much and for how long, unknown alcohol or drug use    Allergies  No Known Allergies    Medications  Prior to Admission Medications   Prescriptions Last Dose Informant Patient Reported? Taking?   ELIQUIS 5 MG Tab month ago at [facility] Patient Yes Yes   Sig: Take 5 mg by mouth 2 times a day.   furosemide (LASIX) 40 MG Tab month ago at [facility] Patient Yes Yes   Sig: Take 40 mg by mouth every day.      Facility-Administered Medications: None       Physical Exam  Temp:  [36 °C (96.8 °F)] 36 °C (96.8 °F)  Pulse:  [116] 116  Resp:  [62] 62  BP: (97)/(70) 97/70  SpO2:  [98 %] 98 %  Blood Pressure: 97/70   Temperature: 36 °C (96.8 °F)   Pulse: (!) 116   Respiration: (!) 62   Pulse Oximetry: 98 %       Physical Exam  Constitutional:       General: She is in acute distress.      Appearance: She is obese. She is ill-appearing and toxic-appearing.   HENT:      Head: Normocephalic and atraumatic.      Right Ear: External ear normal.      Left Ear: External ear normal.      Nose: Nose normal. No rhinorrhea.      Mouth/Throat:      Pharynx: Oropharynx is clear. No oropharyngeal exudate.   Eyes:      General: No scleral icterus.     Conjunctiva/sclera: Conjunctivae normal.      Pupils: Pupils are equal, round, and reactive to light.   Cardiovascular:      Rate and Rhythm: Tachycardia present. Rhythm irregularly irregular.      Pulses:           Radial pulses are 2+ on the right side and 2+ on the left side.        Dorsalis pedis pulses are 2+ on the right side and 2+ on the left side.      Heart  "sounds: No murmur heard.     Comments: Pt with mottling to bilateral legs extending into lower abdomen and bilateral arms  Pulmonary:      Effort: Respiratory distress present.      Breath sounds: Normal breath sounds. No wheezing.   Chest:      Chest wall: No tenderness.   Abdominal:      Tenderness: There is abdominal tenderness. There is guarding and rebound.   Musculoskeletal:         General: Normal range of motion.      Cervical back: Normal range of motion and neck supple.      Right lower leg: Edema present.      Left lower leg: Edema present.   Lymphadenopathy:      Cervical: No cervical adenopathy.   Skin:     General: Skin is dry.      Capillary Refill: Capillary refill takes less than 2 seconds.      Findings: No rash.      Comments: Cool and mottled   Neurological:      General: No focal deficit present.      Mental Status: She is alert. She is disoriented.      Cranial Nerves: No cranial nerve deficit.      Sensory: No sensory deficit.      Motor: No weakness.   Psychiatric:      Comments: Unable to assess         Laboratory:  Recent Labs     03/28/24  1049   RBC 4.42   HEMOGLOBIN 14.0   HEMATOCRIT 43.1   MCV 97.5   MCH 31.7   MCHC 32.5*   RDW 16.2*   PLATELETCT 123*   MPV 9.2         No results for input(s): \"ALTSGPT\", \"ASTSGOT\", \"ALKPHOSPHAT\", \"TBILIRUBIN\", \"DBILIRUBIN\", \"GAMMAGT\", \"AMYLASE\", \"LIPASE\", \"ALB\", \"PREALBUMIN\", \"GLUCOSE\" in the last 72 hours.      No results for input(s): \"NTPROBNP\" in the last 72 hours.      No results for input(s): \"TROPONINT\" in the last 72 hours.    Imaging:  CT-CTA ABDOMEN WITH & W/O-POST PROCESS    (Results Pending)   EC-ECHOCARDIOGRAM COMPLETE W/O CONT    (Results Pending)         Assessment/Plan:  Justification for Admission Status  I anticipate this patient will require at least two midnights for appropriate medical management, necessitating inpatient admission because severe sepsis with ?mesenteric ischemia requiring surgery    Patient will need a ICU (Adult and " Pediatrics) bed on MEDICAL service .  The need is secondary to severe sepsis with lactic acidosis and concern for needing vasopressors.    * Mesenteric ischemia (HCC)- (present on admission)  Assessment & Plan  There is extensive portal venous gas on [facility] imaging  Repeat CT angio abdomen  Surgery consulting    Hypokalemia- (present on admission)  Assessment & Plan  Will cautiously optimize given patient's JOCELYNN     Hypoglycemia- (present on admission)  Assessment & Plan  Hypoglycemia protocols  Bicarb with D5 infusion      Morbid obesity (HCC)- (present on admission)  Assessment & Plan   on the benefits of weight loss and nutrition choices when clinically able    NESHA (obstructive sleep apnea)- (present on admission)  Assessment & Plan  Hx of    Chronic systolic heart failure (HCC)- (present on admission)  Assessment & Plan  Last ECHO in 2022 with EF of 30-35%  On Entresto as an outpatient  Noted RV dilation and dysfunction from prior ECHO  Repeat ECHO   Maintain euvolemia    Elevated troponin- (present on admission)  Assessment & Plan  Quite high to just be demand ischemia  Trend troponin  Will need cardiology and anticoagulation     Lactic acidosis- (present on admission)  Assessment & Plan  ?due to mesenteric ischemia  Cont to trend  S/p IVF resuscitation  Bicarb infusion  MAP goals > 65        Acute encephalopathy- (present on admission)  Assessment & Plan  Due to sepsis  Limit sedatives  Correct all underlying metabolic disturbances     Severe sepsis (HCC)- (present on admission)  Assessment & Plan  This is Sepsis Present on admission  SIRS criteria identified on my evaluation include: Tachycardia, with heart rate greater than 90 BPM, Tachypnea, with respirations greater than 20 per minute, and Leukocytosis, with WBC greater than 12,000  Clinical indicators of end organ dysfunction include Lactic Acid greater than 2, Toxic Metabolic Encephalopathy, Acute Renal Failure, with a finding of creatinine greater  than 2 (patient does not have ESRD or CKD, and GCS < 15  Source is ?intra-abdominal   Sepsis protocol initiated  Crystalloid Fluid Administration: Fluid resuscitation ordered per standard protocol - 30 mL/kg per current or ideal body weight (at [facility])  IV antibiotics as appropriate for source of sepsis  Reassessment: I have reassessed the patient's hemodynamic status    S/p IVF administration  MAP goals > 65  Trend lactic acid  Follow cultures  Broad spectrum abx:  zosyn    JOCELYNN (acute kidney injury) ([facility])- (present on admission)  Assessment & Plan  Due to severe sepsis and mesenteric ischemia  Bicarbonate infusion  Monitor UOP/creatinine  Avoid nephrotoxins     Atrial fibrillation with RVR ([facility])- (present on admission)  Assessment & Plan  Cont diltiazem infusion for HR< 120  ?compliance with Eliquis  Optimize K > 4 and Mg > 2  Holding Eliquis for now        VTE prophylaxis: SCDs/TEDs    The patient is critically ill at this time with severe sepsis and multiorgan failure likely due to an intra abdominal source.  I have assessed and reassessed the patient's hemodynamics, cardiovascular status, neurological status, and respiratory status.  The patient is at very high risk of clinical deterioration, worsening vital organ dysfunction, and death without the above critical care interventions.    Critical care time = 110 minutes.

## 2024-03-29 NOTE — PROGRESS NOTES
Met with the family at bedside and described the unfortunately catastrophic diagnosis with associated multiorgan failure that Ms. Mancini has suffered. The family tearfully understands the severity of the diagnosis and does not believe she would want to be kept alive in this setting. In accordance with this, she will be made comfort care and allowed to pass away without pain or anxiety.     Quan Martin M.D.

## 2024-03-29 NOTE — ANESTHESIA TIME REPORT
Anesthesia Start and Stop Event Times       Date Time Event    3/28/2024 1815 Ready for Procedure     1815 Anesthesia Start     1928 Anesthesia Stop          Responsible Staff  03/28/24      Name Role Begin End    Stanley Davis M.D. Anesth 1815 1928          Overtime Reason:  no overtime (within assigned shift)    Comments:

## 2024-03-29 NOTE — ASSESSMENT & PLAN NOTE
on the benefits of weight loss and nutrition choices when clinically able  
?due to mesenteric ischemia  Cont to trend  S/p IVF resuscitation  Bicarb infusion  MAP goals > 65      
Cont diltiazem infusion for HR< 120  ?compliance with Eliquis  Optimize K > 4 and Mg > 2  Holding Eliquis for now  
Due to sepsis  Limit sedatives  Correct all underlying metabolic disturbances   
Due to severe sepsis and mesenteric ischemia  Bicarbonate infusion  Monitor UOP/creatinine  Avoid nephrotoxins   
Hx of  
Hypoglycemia protocols  Bicarb with D5 infusion    
Last ECHO in 2022 with EF of 30-35%  On Entresto as an outpatient  Noted RV dilation and dysfunction from prior ECHO  Repeat ECHO   Maintain euvolemia  
Quite high to just be demand ischemia  Trend troponin  Will need cardiology and anticoagulation   
There is extensive portal venous gas on [facility] imaging  Repeat CT angio abdomen  Surgery consulting  
This is Sepsis Present on admission  SIRS criteria identified on my evaluation include: Tachycardia, with heart rate greater than 90 BPM, Tachypnea, with respirations greater than 20 per minute, and Leukocytosis, with WBC greater than 12,000  Clinical indicators of end organ dysfunction include Lactic Acid greater than 2, Toxic Metabolic Encephalopathy, Acute Renal Failure, with a finding of creatinine greater than 2 (patient does not have ESRD or CKD, and GCS < 15  Source is ?intra-abdominal   Sepsis protocol initiated  Crystalloid Fluid Administration: Fluid resuscitation ordered per standard protocol - 30 mL/kg per current or ideal body weight (at [facility])  IV antibiotics as appropriate for source of sepsis  Reassessment: I have reassessed the patient's hemodynamic status    S/p IVF administration  MAP goals > 65  Trend lactic acid  Follow cultures  Broad spectrum abx:  zosyn  
Will cautiously optimize given patient's JOCELYNN   
hard copy, drawn during this pregnancy

## 2024-03-29 NOTE — ANESTHESIA PROCEDURE NOTES
Airway    Date/Time: 3/28/2024 6:30 PM    Performed by: Stanley Davis M.D.  Authorized by: Stanley Davis M.D.    Location:  OR  Urgency:  Elective  Indications for Airway Management:  Anesthesia      Spontaneous Ventilation: absent    Sedation Level:  Deep  Preoxygenated: Yes    Patient Position:  Sniffing  Mask Difficulty Assessment:  0 - not attempted  Final Airway Type:  Endotracheal airway  Final Endotracheal Airway:  ETT  Cuffed: Yes    Technique Used for Successful ETT Placement:  Direct laryngoscopy  Devices/Methods Used in Placement:  Cricoid pressure    Insertion Site:  Oral  Blade Type:  Rosalinda  Laryngoscope Blade/Videolaryngoscope Blade Size:  3  ETT Size (mm):  7.5  Measured from:  Teeth  ETT to Teeth (cm):  22  Placement Verified by: auscultation and capnometry    Cormack-Lehane Classification:  Grade I - full view of glottis  Number of Attempts at Approach:  1

## 2024-03-29 NOTE — PROGRESS NOTES
1925 to T909  4 Eyes Skin Assessment Completed by MERVIN Anders and Sukumar RN.    Head WDL  Ears Mottled  Nose L nare NG  Mouth WDL  Neck WDL, CVC in place  Breast/Chest Bruising, petechiae  Shoulder Blades WDL  Spine WDL, skin tags present  (R) Arm/Elbow/Hand Mottled,  (L) Arm/Elbow/Hand Mottled  Abdomen  Mottled, Midline incision with wound vac  Groin Moist  Scrotum/Coccyx/Buttocks   (R) Leg  Mottled, bruising  (L) Leg  Mottled, bruising  (R) Heel/Foot/Toe  Mottled, dry/cracked boggy heels, noah  (L) Heel/Foot/Toe  Mottled, dry/cracked boggy heels noah          Devices In Places ECG, Tele Box, Blood Pressure Cuff, Pulse Ox, Albarran, ET Tube, OG/NG, and Central Line      Interventions In Place Heel Mepilex, Sacral Mepilex, Heel Float Boots, TAP System, Pillows, Optifoam, Q2 Turns, Low Air Loss Mattress, Barrier Cream, and Heels Loaded W/Pillows    Possible Skin Injury Yes    Pictures Uploaded Into Epic N/A  Wound Consult Placed Yes  RN Wound Prevention Protocol Ordered Yes

## 2024-03-29 NOTE — DISCHARGE SUMMARY
Death Summary    Cause of Death  Mesenteric ischemia due to atrial fibrillation due to heart failure    Comorbid Conditions at the Time of Death  Principal Problem:    Mesenteric ischemia (HCC) (POA: Yes)  Active Problems:    Atrial fibrillation with RVR (HCC) (POA: Yes)    JOCELYNN (acute kidney injury) (HCC) (POA: Yes)    Severe sepsis (HCC) (POA: Yes)    Acute encephalopathy (POA: Yes)    Lactic acidosis (POA: Yes)    Elevated troponin (POA: Yes)    Chronic systolic heart failure (HCC) (POA: Yes)    NESHA (obstructive sleep apnea) (POA: Yes)    Morbid obesity (HCC) (POA: Yes)    Hypoglycemia (POA: Yes)    Antiplatelet or antithrombotic long-term use (POA: Yes)    Hypokalemia (POA: Yes)  Resolved Problems:    * No resolved hospital problems. *      History of Presenting Illness and Hospital Course  Keyla Mancini is a 63 y.o. female with the past medical history of NESHA, chronic systolic CHF with EF of 30-35%, RV dilation with dysfunction, morbid obesity, atrial fibrillation on Eliquis, and COPD who presented to [facility] on 3/28/2024 with abdominal pain with diarrhea for several days.  She was found to be in atrial fibrillation with RVR as well as hypotensive with SBPs in the 80s.  The patient was given a 30 cc/kg fluid bolus with additional IVF with improvement to SBPs.  She was given Zosyn.  She was given IV diltiazem for her atrial fibrillation.  CTA chest did not show a pulmonary embolus, but bilateral lower lobe atelectasis.  CT abdomen/pelvis revealed extensive portal venous gas and the surgery team at [facility] was uncomfortable with operating on the patient and sent the patient to [facility] for high level of care     She was taken emergently to the operating room for exploratory laparotomy where the entirety of her small bowel was found to be necrotic and resected.  Also she had necrosis through her omentum and in her transverse colon.  She was left with an open abdomen, brought back to the ICU and  was rapidly escalating multiple vasopressors and increasing lactic acidosis.    Due to nonsurvivable nature of total bowel ischemia family meeting was held and she was transition to comfort care.        Date of Death: 03/29/24   Time of Death: 0225  Pronounced by: Eris Rodriguez M.D.      Eris Rodriguez M.D.

## 2024-03-29 NOTE — CARE PLAN
The patient is Watcher - Medium risk of patient condition declining or worsening    Shift Goals  Clinical Goals: emergency surgery  Patient Goals: Pain relief  Family Goals: Updates    Progress made toward(s) clinical / shift goals:      Problem: Knowledge Deficit - Standard  Goal: Patient and family/care givers will demonstrate understanding of plan of care, disease process/condition, diagnostic tests and medications  Outcome: Progressing     Problem: Pain - Standard  Goal: Alleviation of pain or a reduction in pain to the patient’s comfort goal  Outcome: Progressing     Problem: Skin Integrity  Goal: Skin integrity is maintained or improved  Outcome: Progressing       Patient is not progressing towards the following goals:

## 2024-03-29 NOTE — PROGRESS NOTES
Patient now running vasopressin and maxed on norepinephrine. APRN notified, new orders received and initiated.

## 2024-03-29 NOTE — ANESTHESIA PROCEDURE NOTES
Central Venous Line    Performed by: Stanley Davis M.D.  Authorized by: Stanley Davis M.D.    Start Time:  3/28/2024 6:41 PM  End Time:  3/28/2024 6:54 PM  Patient Location:  OR  Indication: central venous access        provider hand hygiene performed prior to central venous catheter insertion, all 5 sterile barriers used (gloves, gown, cap, mask, large sterile drape) during central venous catheter insertion and skin prep agent completely dried prior to procedure    Patient Position:  Trendelenburg  Laterality:  Right  Site:  Internal jugular  Prep:  Chlorhexidine  Catheter Size:  7 Fr  Catheter Length (cm):  20  Number of Lumens:  Triple lumen  target vein identified, needle advanced into vein and blood aspirated and guidewire advanced into vein    Seldinger Technique?: Yes    Ultrasound-Guided: ultrasound-guided  Image captured, interpreted and electronically stored.  Sterile Gel and Probe Cover Used for Ultrasound?: Yes    Intravenous Verification: verified by ultrasound, venous blood return and chest x-ray pending    all ports aspirated, all ports flushed easily, guidewire was removed intact, biopatch was applied, line was sutured in place and dressing was applied    Events: patient tolerated procedure well with no complications    PA Catheter Placed?: No

## 2024-03-29 NOTE — PROGRESS NOTES
Patient requiring quick increases in norepi to maintain MAP>65. APRN notified, new orders received and initiated.

## 2024-03-29 NOTE — ANESTHESIA PREPROCEDURE EVALUATION
Case: [id number] Date/Time: 03/28/24 1700    Procedure: LAPAROTOMY, EXPLORATORY    Location: [facility] OR 10 / SURGERY [facility]    Surgeons: Melissa Ratliff M.D.            Relevant Problems   ANESTHESIA   (positive) NESHA (obstructive sleep apnea)      CARDIAC   (positive) Atrial fibrillation with RVR (HCC)   (positive) Mesenteric ischemia (HCC)         (positive) JOCELYNN (acute kidney injury) (HCC)       Physical Exam    Airway   Mallampati: II  TM distance: >3 FB  Neck ROM: full       Cardiovascular - normal exam  Rhythm: irregular  Rate: abnormal  (-) murmur     Dental - normal exam           Pulmonary - normal exam  Breath sounds clear to auscultation     Abdominal   (+) obese  Abdomen: tender     Neurological - normal exam                   Anesthesia Plan    ASA 5- EMERGENT   ASA physical status 5 criteria: ischemic bowel in the face of significant cardiac pathology or multiple organ/system dysfunctionASA physical status emergent criteria: sepsis and acute peritonitis    Plan - general       Airway plan will be ETT    (62 y/o female with Afib with RVR, severe sepsis with lactate 14, acute on chronic heart failure with EF 30%, elevated troponin, morbid obesity, and at high risk for intraoperative and postoperative complications. Plan for pre-induction arterial line, central line, RSI, pressors available as needed.)      Induction: intravenous    Postoperative Plan: Postoperative administration of opioids is intended.    Pertinent diagnostic labs and testing reviewed    Informed Consent:    Anesthetic plan and risks discussed with patient.    Use of blood products discussed with: patient whom consented to blood products.

## (undated) DEVICE — GLOVE BIOGEL SZ 7 SURGICAL PF LTX - (50PR/BX 4BX/CA)

## (undated) DEVICE — CANISTER SUCTION 3000ML MECHANICAL FILTER AUTO SHUTOFF MEDI-VAC NONSTERILE LF DISP  (40EA/CA)

## (undated) DEVICE — SENSOR OXIMETER ADULT SPO2 RD SET (20EA/BX)

## (undated) DEVICE — TRAY MULTI-LUMEN 7FR PRESSURE W/MAX BARRIER AND BIOPATCH - (5/CA)

## (undated) DEVICE — GLOVE BIOGEL INDICATOR SZ 7.5 SURGICAL PF LTX - (50PR/BX 4BX/CA)

## (undated) DEVICE — GOWN WARMING STANDARD FLEX - (30/CA)

## (undated) DEVICE — SUTURE 2-0 COATED VICRYL PLUS - 12 X 18 INCH (12/BX)

## (undated) DEVICE — SET LEADWIRE 5 LEAD BEDSIDE DISPOSABLE ECG (1SET OF 5/EA)

## (undated) DEVICE — SUTURE 3-0 VICRYL PLUS SH - 8X 18 INCH (12/BX)

## (undated) DEVICE — SET EXTENSION WITH 2 PORTS (48EA/CA) ***PART #2C8610 IS A SUBSTITUTE*****

## (undated) DEVICE — SUTURE 0 PDS-2 CTX 36 INCH - (24/BX)

## (undated) DEVICE — DRAPE LARGE 3 QUARTER - (20/CA)

## (undated) DEVICE — CHLORAPREP 26 ML APPLICATOR - ORANGE TINT(25/CA)

## (undated) DEVICE — CORETEMP DRAPE FORM-FITTED EASY DROPANDGO DRAPE FOR USE ON THE CORETEMP FLUID MANAGEMENT 56IN X 56IN

## (undated) DEVICE — SPONGE GAUZESTER 4 X 4 4PLY - (128PK/CA)

## (undated) DEVICE — GOWN SURGICAL X-LARGE ULTRA - FILM-REINFORCED (20/CA)

## (undated) DEVICE — TOWELS CLOTH SURGICAL - (4/PK 20PK/CA)

## (undated) DEVICE — GLOVE CHEMO/AUTOPSY LARGE - POWDER FREE (50/BX)

## (undated) DEVICE — TUBING CLEARLINK DUO-VENT - C-FLO (48EA/CA)

## (undated) DEVICE — SLEEVE, VASO, THIGH, MED

## (undated) DEVICE — ELECTRODE DUAL RETURN W/ CORD - (50/PK)

## (undated) DEVICE — SODIUM CHL IRRIGATION 0.9% 1000ML (12EA/CA)

## (undated) DEVICE — DRAPE MAYO STAND - (30/CA)

## (undated) DEVICE — PACK MAJOR BASIN - (2EA/CA)

## (undated) DEVICE — CANISTER INFO VAC 1000ML (5EA/CA)

## (undated) DEVICE — BOVIE  BLADE 6 EXTENDED - (50/PK)

## (undated) DEVICE — STAPLER SKIN DISP - (6/BX 10BX/CA) VISISTAT

## (undated) DEVICE — PAD LAP STERILE 18 X 18 - (5/PK 40PK/CA)

## (undated) DEVICE — GLOVE BIOGEL SZ 7.5 SURGICAL PF LTX - (50PR/BX 4BX/CA)

## (undated) DEVICE — KIT DRESSING WOUND VAC ABDOMEN W/TRAC PAD (5EA/CA)

## (undated) DEVICE — SUTURE GENERAL

## (undated) DEVICE — COVER LIGHT HANDLE ALC PLUS DISP (18EA/BX)

## (undated) DEVICE — SUCTION INSTRUMENT YANKAUER BULBOUS TIP W/O VENT (50EA/CA)

## (undated) DEVICE — LACTATED RINGERS INJ 1000 ML - (14EA/CA 60CA/PF)

## (undated) DEVICE — CLEANER ELECTRO-SURGICAL TIP - (25/BX 4BX/CA)